# Patient Record
Sex: MALE | Race: WHITE | NOT HISPANIC OR LATINO | Employment: STUDENT | URBAN - METROPOLITAN AREA
[De-identification: names, ages, dates, MRNs, and addresses within clinical notes are randomized per-mention and may not be internally consistent; named-entity substitution may affect disease eponyms.]

---

## 2017-08-14 ENCOUNTER — THERAPY VISIT (OUTPATIENT)
Dept: PHYSICAL THERAPY | Facility: CLINIC | Age: 22
End: 2017-08-14
Payer: COMMERCIAL

## 2017-08-14 DIAGNOSIS — M25.562 KNEE PAIN, LEFT: ICD-10-CM

## 2017-08-14 DIAGNOSIS — M25.561 KNEE PAIN, RIGHT: ICD-10-CM

## 2017-08-14 PROCEDURE — 97110 THERAPEUTIC EXERCISES: CPT | Mod: GP | Performed by: PHYSICAL THERAPIST

## 2017-08-14 PROCEDURE — 97530 THERAPEUTIC ACTIVITIES: CPT | Mod: GP | Performed by: PHYSICAL THERAPIST

## 2017-08-14 PROCEDURE — 97161 PT EVAL LOW COMPLEX 20 MIN: CPT | Mod: GP | Performed by: PHYSICAL THERAPIST

## 2017-08-14 ASSESSMENT — ACTIVITIES OF DAILY LIVING (ADL)
STAND: ACTIVITY IS MINIMALLY DIFFICULT
AS_A_RESULT_OF_YOUR_KNEE_INJURY,_HOW_WOULD_YOU_RATE_YOUR_CURRENT_LEVEL_OF_DAILY_ACTIVITY?: ABNORMAL
KNEEL ON THE FRONT OF YOUR KNEE: ACTIVITY IS VERY DIFFICULT
RISE FROM A CHAIR: ACTIVITY IS SOMEWHAT DIFFICULT
WALK: ACTIVITY IS MINIMALLY DIFFICULT
GO UP STAIRS: ACTIVITY IS MINIMALLY DIFFICULT
GIVING WAY, BUCKLING OR SHIFTING OF KNEE: I DO NOT HAVE THE SYMPTOM
KNEE_ACTIVITY_OF_DAILY_LIVING_SUM: 48
HOW_WOULD_YOU_RATE_THE_CURRENT_FUNCTION_OF_YOUR_KNEE_DURING_YOUR_USUAL_DAILY_ACTIVITIES_ON_A_SCALE_FROM_0_TO_100_WITH_100_BEING_YOUR_LEVEL_OF_KNEE_FUNCTION_PRIOR_TO_YOUR_INJURY_AND_0_BEING_THE_INABILITY_TO_PERFORM_ANY_OF_YOUR_USUAL_DAILY_ACTIVITIES?: 30
KNEE_ACTIVITY_OF_DAILY_LIVING_SCORE: 68.57
SQUAT: ACTIVITY IS FAIRLY DIFFICULT
SWELLING: I DO NOT HAVE THE SYMPTOM
LIMPING: THE SYMPTOM AFFECTS MY ACTIVITY SLIGHTLY
PAIN: THE SYMPTOM AFFECTS MY ACTIVITY MODERATELY
STIFFNESS: THE SYMPTOM AFFECTS MY ACTIVITY SLIGHTLY
RAW_SCORE: 48
GO DOWN STAIRS: ACTIVITY IS SOMEWHAT DIFFICULT
HOW_WOULD_YOU_RATE_THE_OVERALL_FUNCTION_OF_YOUR_KNEE_DURING_YOUR_USUAL_DAILY_ACTIVITIES?: ABNORMAL
WEAKNESS: I DO NOT HAVE THE SYMPTOM
SIT WITH YOUR KNEE BENT: ACTIVITY IS MINIMALLY DIFFICULT

## 2017-08-14 NOTE — MR AVS SNAPSHOT
"              After Visit Summary   8/14/2017    Alex Peng    MRN: [id number]           Patient Information     Date Of Birth          1995        Visit Information        Provider Department      8/14/2017 1:10 PM Himanshu Mcadams PT [facility] Physical Therapy        Today's Diagnoses     Knee pain, right        Knee pain, left           Follow-ups after your visit        Your next 10 appointments already scheduled     Aug 21, 2017  8:40 AM CDT   SCOTT Extremity with Himanshu Mcadams PT   [facility] Physical Therapy ([facility]  )    [address]  [facility] [address] [phone]              Who to contact     If you have questions or need follow up information about today's clinic visit or your schedule please contact [facility] PHYSICAL THERAPY directly at [phone].  Normal or non-critical lab and imaging results will be communicated to you by Origo.byhart, letter or phone within 4 business days after the clinic has received the results. If you do not hear from us within 7 days, please contact the clinic through [email or web address].byhart or phone. If you have a critical or abnormal lab result, we will notify you by phone as soon as possible.  Submit refill requests through Tradier or call your pharmacy and they will forward the refill request to us. Please allow 3 business days for your refill to be completed.          Additional Information About Your Visit        [email or web address] Information     Tradier lets you send messages to your doctor, view your test results, renew your prescriptions, schedule appointments and more. To sign up, go to [email or web address] . Click on \"Log in\" on the left side of the screen, which will take you to the Welcome page. Then click on \"Sign up Now\" on the right side of the page.     You will be asked to enter the access code listed below, as well as some personal information. Please follow " the directions to create your username and password.     Your access code is: [email or web address]  Expires: 2017  4:38 PM     Your access code will  in 90 days. If you need help or a new code, please call your [facility] clinic or [phone].        Care EveryWhere ID     This is your Care EveryWhere ID. This could be used by other organizations to access your [facility] medical records  [id number]         Blood Pressure from Last 3 Encounters:   12/30/15 118/80   12 114/59   11 100/65    Weight from Last 3 Encounters:   12/30/15 54.4 kg (120 lb)   11 57.6 kg (127 lb) (22 %)*   08 45.7 kg (100 lb 11.2 oz) (24 %)*     * Growth percentiles are based on [facility] 2-20 Years data.              We Performed the Following     HC PT EVAL, LOW COMPLEXITY     SCOTT INITIAL EVAL REPORT     THERAPEUTIC ACTIVITIES     THERAPEUTIC EXERCISES        Primary Care Provider Office Phone # Fax #    Ap Jones [phone] [address] [address]        Equal Access to Services     [facility] : Hadii aad ku kingsleyo Sokarlene, waaxda luqadaha, qaybta kaalmada adefarayada, nilesh olivares. So [facility] [phone].    ATENCIÓN: Si habla español, tiene a bedolla disposición servicios gratuitos de asistencia lingüística. Llame al [phone].    We comply with applicable federal civil rights laws and [place] laws. We do not discriminate on the basis of race, color, national origin, age, disability sex, sexual orientation or gender identity.            Thank you!     Thank you for choosing [facility] PHYSICAL THERAPY  for your care. Our goal is always to provide you with excellent care. Hearing back from our patients is one way we can continue to improve our services. Please take a few minutes to complete the written survey that you may receive in the mail after your visit with us. Thank you!             Your Updated  Medication List - Protect others around you: Learn how to safely use, store and throw away your medicines at www.disposemymeds.org.          This list is accurate as of: 8/14/17  4:38 PM.  Always use your most recent med list.                   Brand Name Dispense Instructions for use Diagnosis    ALPRAZolam 0.5 MG tablet    XANAX     Take 0.5 mg by mouth 3 times daily as needed for anxiety (pt states takes 1 tab as needed. Has taken 3 times daily)        Methylphenidate HCl ER 54 MG Tb24      Take 1 tablet by mouth daily        mirtazapine 30 MG tablet    REMERON     Take 30 mg by mouth At Bedtime

## 2017-08-14 NOTE — LETTER
[place] [facility] PHYSICAL [facility]  [address]  [facility] [id number] [phone]    2017    Re: Alex Peng   :   1995  MRN:  [id number]    REFERRING PHYSICIAN:   Juarez Ramos    [facility] ATHLETIC [facility] PHYSICAL [facility]    Date of Initial Evaluation:  2017  Visits:  Rxs Used: 1  Reason for Referral:     Knee pain, right  Knee pain, left    EVALUATION SUMMARY    Subjective:  Patient is a 22 year old male presenting with rehab left knee hpi.   Alex Peng is a 22 year old male with a bilateral knees condition.  Condition occurred with:  Insidious onset.  Condition occurred: for unknown reasons.  This is a recurrent condition  17.  Pt was working which required a lot of standing, thinks he had his knees hyper extended most of the day.  It was a lot more walking than usual, mostly on concrete floor.  Has been seen by his PCP and ortho.  No imaging done.  PCP provided knee braces which the pt has been wearing inconsistently and doesn't know their effect on his symptoms.  Pt reports having the same knee problems since  from snowboarding injury.  Has off/on knee problems since then.  Current episode feels similar to previous episodes.  Pain has previously decreased by decreasing activity, has tried PT but can't recall its efficacy.  Pain increased yesterday - thinks it might be from a seated foot massage on a vibrating platform, a lot of walking, other activity the day before..    Patient reports pain:  Sub patellar.  Radiates to: lateral thigh.  Pain is described as burning and aching (occasionally sharp) and is intermittent and reported as 3/10.  Associated symptoms:  Other (cracking). Pain is the same all the time (with weight bearing).  Symptoms are exacerbated by weight bearing, kneeling, descending stairs and walking and relieved by rest.  Since onset symptoms are gradually improving (though increased  yesterday).    Previous treatment includes physical therapy.  There was mild improvement following previous treatment.  General health as reported by patient is poor.  Pertinent medical history includes:  Seizures, depression and sleep disorder/apnea (seizures are controlled).  Medical allergies: no.  Other surgeries include:  None reported.  Current medications:  Anti-seizure medication, sleep medication, anti-depressants and other (stimulants).  Current occupation is none.    Primary job tasks include:  Prolonged sitting, prolonged standing, lifting and other (pushing/pulling, computer work).  Barriers include:  None as reported by the patient.  Red flags:  None as reported by the patient.  Objective:  Standing Alignment:    Lumbar:  Lordosis decr  Pelvic:  Normal  Hip deviations alignment: R>L increased femoral medial rotation.  Knee deviations alignment: pt reports he usually stands in B genu recurvatum, but he has been avoiding it lately due to pain.  Physical Exam   Functional Tests:  L single leg stance: Trendelenberg, reports instability  R single leg stance: femoral medial rotation, reports instability, slight increase in pain at patella  Partial squat: good femoral control, no change in sxs  Sensation:  Light touch intact and symmetrical B LEs  Strength:  Hip flexion: B 5/5 slight increase in pain B  Knee extension: B 5/5 pain free  Dorsiflexion: B 5/5 pain free  Glut med: B 4/5, pain R medial knee while resisting L  Glut max: B 4+/5, pain R in lumbar spine  Hamstrings: B 5/5, pain B anterior inferior knee    ROM:  Motion L R Comments   Active knee flexion 140 143 Pain free   Passive knee ext 5 deg hyper 8 deg hyper Pt states he can go further but it hurts, R>L pain     Palpation:   B tenderness along ITB and patellar tendon, non tender at joint lines and with patellar mobizations  Edema:  None noted  Gait:  R>L femoral medial rotation, excessive lumbopelvic rotation, lacks full knee ext  B  Assessment/Plan:    Patient is a 22 year old male with both sides knee complaints.    Patient has the following significant findings with corresponding treatment plan.                Diagnosis 1:  B knee pain  Pain -  hot/cold therapy, manual therapy, education and home program  Decreased strength - therapeutic exercise, therapeutic activities and home program  Impaired gait - gait training and home program  Impaired muscle performance - neuro re-education and home program  Decreased function - therapeutic activities and home program  Impaired posture - neuro re-education, therapeutic activities and home program    Therapy Evaluation Codes:   1) History comprised of:   Personal factors that impact the plan of care:      Past/current experiences and Time since onset of symptoms.    Comorbidity factors that impact the plan of care are:      Depression, Seizures and Sleep disorder/apnea.     Medications impacting care: Anti-depressant, Sleep and anti-seizure medication, stimulants.  2) Examination of Body Systems comprised of:   Body structures and functions that impact the plan of care:      Knee.   Activity limitations that impact the plan of care are:      Squatting/kneeling, Stairs and Walking.  3) Clinical presentation characteristics are:   Stable/Uncomplicated.  4) Decision-Making    Low complexity using standardized patient assessment instrument and/or measureable assessment of functional outcome.  Cumulative Therapy Evaluation is: Low complexity.    Previous and current functional limitations:  (See Goal Flow Sheet for this information)    Short term and Long term goals: (See Goal Flow Sheet for this information)     Communication ability:  Patient appears to be able to clearly communicate and understand verbal and written communication and follow directions correctly.  Treatment Explanation - The following has been discussed with the patient:   RX ordered/plan of care  Anticipated outcomes  Possible risks  and side effects  This patient would benefit from PT intervention to resume normal activities.   Rehab potential is good.    Frequency:  1 X week, once daily  Duration:  for 6 weeks  Discharge Plan:  Achieve all LTG.  Independent in home treatment program.  Reach maximal therapeutic benefit.        Thank you for your referral.    INQUIRIES  Therapist: Himanshu Mcadams PT, DPT   [place] [facility] PHYSICAL THERAPY  [address] [place] [address] [address]  [facility] [address]  Phone: [phone]  Fax: [phone]

## 2017-08-21 ENCOUNTER — THERAPY VISIT (OUTPATIENT)
Dept: PHYSICAL THERAPY | Facility: CLINIC | Age: 22
End: 2017-08-21
Payer: COMMERCIAL

## 2017-08-21 DIAGNOSIS — M25.561 KNEE PAIN, RIGHT: ICD-10-CM

## 2017-08-21 DIAGNOSIS — M25.562 KNEE PAIN, LEFT: ICD-10-CM

## 2017-08-21 PROCEDURE — 97110 THERAPEUTIC EXERCISES: CPT | Mod: GP | Performed by: PHYSICAL THERAPIST

## 2017-08-28 ENCOUNTER — THERAPY VISIT (OUTPATIENT)
Dept: PHYSICAL THERAPY | Facility: CLINIC | Age: 22
End: 2017-08-28
Payer: COMMERCIAL

## 2017-08-28 DIAGNOSIS — M25.561 KNEE PAIN, RIGHT: ICD-10-CM

## 2017-08-28 DIAGNOSIS — M25.562 KNEE PAIN, LEFT: ICD-10-CM

## 2017-08-28 PROCEDURE — 97112 NEUROMUSCULAR REEDUCATION: CPT | Mod: GP | Performed by: PHYSICAL THERAPIST

## 2017-08-28 PROCEDURE — 97110 THERAPEUTIC EXERCISES: CPT | Mod: GP | Performed by: PHYSICAL THERAPIST

## 2017-08-28 PROCEDURE — 97530 THERAPEUTIC ACTIVITIES: CPT | Mod: GP | Performed by: PHYSICAL THERAPIST

## 2017-09-11 ENCOUNTER — THERAPY VISIT (OUTPATIENT)
Dept: PHYSICAL THERAPY | Facility: CLINIC | Age: 22
End: 2017-09-11
Payer: COMMERCIAL

## 2017-09-11 DIAGNOSIS — M25.561 ACUTE PAIN OF RIGHT KNEE: ICD-10-CM

## 2017-09-11 DIAGNOSIS — M25.562 ACUTE PAIN OF LEFT KNEE: ICD-10-CM

## 2017-09-11 PROCEDURE — 97110 THERAPEUTIC EXERCISES: CPT | Mod: GP | Performed by: PHYSICAL THERAPIST

## 2017-09-11 PROCEDURE — 97530 THERAPEUTIC ACTIVITIES: CPT | Mod: GP | Performed by: PHYSICAL THERAPIST

## 2017-09-11 PROCEDURE — 97112 NEUROMUSCULAR REEDUCATION: CPT | Mod: GP | Performed by: PHYSICAL THERAPIST

## 2017-09-11 NOTE — LETTER
[facility] ATHLETIC [facility] PHYSICAL [facility]  [address]  [facility] [address] [phone]    2017    Re: Alex Peng   :   1995  MRN:  [id number]   REFERRING PHYSICIAN:   Juarez Ramos    [facility] ATHLETIC [facility]  Date of Initial Evaluation:  2017  Visits: 4 Rxs Used: 4  Reason for Referral:     Acute pain of right knee  Acute pain of left knee    PROGRESS  REPORT  Progress reporting period is from 2017 to 2017.       SUBJECTIVE  Subjective changes noted by patient:  Subjective: Has been biking a small amount and walking.      Current pain level is  Current Pain level: 0/10.     Previous pain level was   Initial Pain level: 3/10.   Changes in function:  Yes (See Goal flowsheet attached for changes in current functional level)  Adverse reaction to treatment or activity: None    OBJECTIVE  Changes noted in objective findings:  Yes,   Objective: No pain with squatting and lifting #10 in crate today.  Feels some pressure with full squat especially with coming out of this.  Good knee control with step down from 2 inch step     ASSESSMENT/PLAN  Updated problem list and treatment plan: Diagnosis 1:  Knee pain  Pain -  hot/cold therapy, self management, education and home program  Decreased ROM/flexibility - manual therapy, therapeutic exercise and home program  Decreased strength - therapeutic exercise, therapeutic activities and home program  Decreased function - therapeutic activities and home program  STG/LTGs have been met or progress has been made towards goals:  Yes (See Goal flow sheet completed today.)  Assessment of Progress: The patient's condition is improving.  The patient's condition has potential to improve.  Self Management Plans:  Patient has been instructed in a home treatment program.  I have re-evaluated this patient and find that the nature, scope, duration and intensity of the therapy is  appropriate for the medical condition of the patient.  Alex continues to require the following intervention to meet STG and LTG's:  Patient needs to continue to work on the home exercise program.    Recommendations:  This patient is ready to be discharged from therapy and continue their home treatment program.              Re: Alex Winterdeangelonatalee   :   1995    Thank you for your referral.    INQUIRIES  Therapist: Rach Sexton PT [facility]  [facility] PHYSICAL THERAPY  [address] [place] [address] [address]  [facility] [address]  Phone: [phone]  Fax: [phone]

## 2017-09-11 NOTE — PROGRESS NOTES
Subjective:    HPI                    Objective:    System    Physical Exam    General     ROS    Assessment/Plan:      PROGRESS  REPORT    Progress reporting period is from 8/14/2017 to 9/11/2017.       SUBJECTIVE  Subjective changes noted by patient:  .  Subjective: Has been biking a small amount and walking.      Current pain level is  Current Pain level: 0/10.     Previous pain level was   Initial Pain level: 3/10.   Changes in function:  Yes (See Goal flowsheet attached for changes in current functional level)  Adverse reaction to treatment or activity: None    OBJECTIVE  Changes noted in objective findings:  Yes,   Objective: No pain with squatting and lifting #10 in crate today.  Feels some pressure with full squat especially with coming out of this.  Good knee control with step down from 2 inch step     ASSESSMENT/PLAN  Updated problem list and treatment plan: Diagnosis 1:  Knee pain  Pain -  hot/cold therapy, self management, education and home program  Decreased ROM/flexibility - manual therapy, therapeutic exercise and home program  Decreased strength - therapeutic exercise, therapeutic activities and home program  Decreased function - therapeutic activities and home program  STG/LTGs have been met or progress has been made towards goals:  Yes (See Goal flow sheet completed today.)  Assessment of Progress: The patient's condition is improving.  The patient's condition has potential to improve.  Self Management Plans:  Patient has been instructed in a home treatment program.  I have re-evaluated this patient and find that the nature, scope, duration and intensity of the therapy is appropriate for the medical condition of the patient.  Alex continues to require the following intervention to meet STG and LTG's:  Patient needs to continue to work on the home exercise program.      Recommendations:  This patient is ready to be discharged from therapy and continue their home treatment program.    Please refer  to the daily flowsheet for treatment today, total treatment time and time spent performing 1:1 timed codes.

## 2017-09-11 NOTE — MR AVS SNAPSHOT
"              After Visit Summary   2017    Alex Peng    MRN: [id number]           Patient Information     Date Of Birth          1995        Visit Information        Provider Department      2017 10:50 AM Rach Sexton PT [facility] Physical Therapy        Today's Diagnoses     Acute pain of right knee        Acute pain of left knee           Follow-ups after your visit        Who to contact     If you have questions or need follow up information about today's clinic visit or your schedule please contact [facility] ATHLETIC [facility] PHYSICAL [facility] directly at [phone].  Normal or non-critical lab and imaging results will be communicated to you by Total Nutraceutical Solutionshart, letter or phone within 4 business days after the clinic has received the results. If you do not hear from us within 7 days, please contact the clinic through Total Nutraceutical Solutionshart or phone. If you have a critical or abnormal lab result, we will notify you by phone as soon as possible.  Submit refill requests through [employer] or call your pharmacy and they will forward the refill request to us. Please allow 3 business days for your refill to be completed.          Additional Information About Your Visit        MyChart Information     [employer] lets you send messages to your doctor, view your test results, renew your prescriptions, schedule appointments and more. To sign up, go to [email or web address] . Click on \"Log in\" on the left side of the screen, which will take you to the Welcome page. Then click on \"Sign up Now\" on the right side of the page.     You will be asked to enter the access code listed below, as well as some personal information. Please follow the directions to create your username and password.     Your access code is: [email or web address]  Expires: 2017  4:38 PM     Your access code will  in 90 days. If you need help or a new code, please call your [facility] clinic or [phone].   "      Care EveryWhere ID     This is your Care EveryWhere ID. This could be used by other organizations to access your [facility] medical records  [id number]         Blood Pressure from Last 3 Encounters:   12/30/15 118/80   02/27/12 114/59   05/14/11 100/65    Weight from Last 3 Encounters:   12/30/15 54.4 kg (120 lb)   05/14/11 57.6 kg (127 lb) (22 %)*   05/24/08 45.7 kg (100 lb 11.2 oz) (24 %)*     * Growth percentiles are based on [facility] 2-20 Years data.              We Performed the Following     SCOTT PROGRESS NOTES REPORT     NEUROMUSCULAR RE-EDUCATION     THERAPEUTIC ACTIVITIES     THERAPEUTIC EXERCISES        Primary Care Provider Office Phone # Fax #    Ap Jones [phone] [address] [address]        Equal Access to Services     [facility]: Hadii gareth schroeder hadasho Sokralene, waaxda luqadaha, qaybta kaalmada adefarayada, nilesh shannon . So [facility] [phone].    ATENCIÓN: Si habla español, tiene a bedolla disposición servicios gratuitos de asistencia lingüística. Llame al [phone].    We comply with applicable federal civil rights laws and [place] laws. We do not discriminate on the basis of race, color, national origin, age, disability sex, sexual orientation or gender identity.            Thank you!     Thank you for choosing [facility] PHYSICAL THERAPY  for your care. Our goal is always to provide you with excellent care. Hearing back from our patients is one way we can continue to improve our services. Please take a few minutes to complete the written survey that you may receive in the mail after your visit with us. Thank you!             Your Updated Medication List - Protect others around you: Learn how to safely use, store and throw away your medicines at www.disposemymeds.org.          This list is accurate as of: 9/11/17 11:55 AM.  Always use your most recent med list.                   Brand  Name Dispense Instructions for use Diagnosis    ALPRAZolam 0.5 MG tablet    XANAX     Take 0.5 mg by mouth 3 times daily as needed for anxiety (pt states takes 1 tab as needed. Has taken 3 times daily)        Methylphenidate HCl ER 54 MG Tb24      Take 1 tablet by mouth daily        mirtazapine 30 MG tablet    REMERON     Take 30 mg by mouth At Bedtime

## 2017-10-19 NOTE — TELEPHONE ENCOUNTER
"APPT INFORMATION    Date /Time: 11/7/17, 8:30am    Reason for Appt: Seizures    Ref Provider/Clinic: Self    Patient Contact (Y/N) & Call Details: Yes, transferred from [facility] (pt will sign JULES [facility] at [facility])   Action:      RECORDS CLINIC NAME  (\"None\" if no records ) RECEIVED RECS & IMG? Y/N   (may include other helpful notes)   Internal Clinics: none         External Clinics: [facility]  Need to request records             "

## 2017-10-23 ENCOUNTER — OFFICE VISIT (OUTPATIENT)
Dept: NEUROLOGY | Facility: CLINIC | Age: 22
End: 2017-10-23

## 2017-10-23 DIAGNOSIS — R56.9 SEIZURES (H): Primary | ICD-10-CM

## 2017-10-23 NOTE — MR AVS SNAPSHOT
After Visit Summary   10/23/2017    Alex Peng    MRN: [id number]           Patient Information     Date Of Birth          1995        Visit Information        Provider Department      10/23/2017 8:30 AM  EEG TECH 2 [facility] OUTPATIENT        Today's Diagnoses     Seizures (H)    -  1       Follow-ups after your visit        Your next 10 appointments already scheduled     2017  8:30 AM CST   (Arrive by 8:15 AM)   New Seizure with Esau Rodríguez MD   [facility] Neurology ([facility])    [address] [place] [id number] [phone]              Who to contact     Please call your clinic at [phone] to:    Ask questions about your health    Make or cancel appointments    Discuss your medicines    Learn about your test results    Speak to your doctor   If you have compliments or concerns about an experience at your clinic, or if you wish to file a complaint, please contact [facility] Patient Relations at [phone] or email us at [email or web address]         Additional Information About Your Visit        MyChart Information     [employer] is an electronic gateway that provides easy, online access to your medical records. With Onehub, you can request a clinic appointment, read your test results, renew a prescription or communicate with your care team.     To sign up for Onehub visit the website at [email or web address]   You will be asked to enter the access code listed below, as well as some personal information. Please follow the directions to create your username and password.     Your access code is: [email or web address]  Expires: 2017  4:38 PM     Your access code will  in 90 days. If you need help or a new code, please contact your [facility] Clinic or call [phone] for assistance.        Care EveryWhere ID     This is your Care EveryWhere ID.  This could be used by other organizations to access your [facility] medical records  [id number]         Blood Pressure from Last 3 Encounters:   No data found for BP    Weight from Last 3 Encounters:   No data found for Wt              Today, you had the following     No orders found for display       Primary Care Provider Office Phone # Fax #    Ap Jones [phone] [address] [facility] [address] [address]        Equal Access to Services     PADMINI BEAN : Hadii aad ku hadasho Soomaali, waaxda luqadaha, qaybta kaalmada adeegyada, waxay idiin hayaan adeeg kharash lascout . So [facility] [phone].    ATENCIÓN: Si habla espashutosh, tiene a bedolla disposición servicios gratuitos de asistencia lingüística. Llame al [phone].    We comply with applicable federal civil rights laws and [place] laws. We do not discriminate on the basis of race, color, national origin, age, disability, sex, sexual orientation, or gender identity.            Thank you!     Thank you for choosing [facility]  for your care. Our goal is always to provide you with excellent care. Hearing back from our patients is one way we can continue to improve our services. Please take a few minutes to complete the written survey that you may receive in the mail after your visit with us. Thank you!             Your Updated Medication List - Protect others around you: Learn how to safely use, store and throw away your medicines at www.disposemymeds.org.          This list is accurate as of: 10/23/17 11:59 PM.  Always use your most recent med list.                   Brand Name Dispense Instructions for use Diagnosis    ALPRAZolam 0.5 MG tablet    XANAX     Take 0.5 mg by mouth 3 times daily as needed for anxiety (pt states takes 1 tab as needed. Has taken 3 times daily)        Methylphenidate HCl ER 54 MG Tb24      Take 1 tablet by mouth daily        mirtazapine 30 MG tablet    REMERON     Take 30 mg by mouth At  Bedtime

## 2017-10-23 NOTE — Clinical Note
EEG is ready for interpretation   Pt is scheduled to see odilia sandy as new pt in November   Thanks  Alana

## 2017-10-24 NOTE — TELEPHONE ENCOUNTER
ACTION    What did you do?  [place] [facility].

## 2017-10-27 NOTE — PROCEDURES
[facility] EEG #  (Out-Patient Video-EEG Monitoring)    Name:     Alex Peng   MRN: [id number]   : 1995   Procedure Date: 10/23/2017   Duration of Recording:  3 hours, 0 minutes.      CLINICAL SUMMARY:  This diagnostic video-EEG monitoring procedure was performed in evaluation of seizures in Alex Peng.  He was reported to be receiving levetiracetam, mirtazapine, alprazolam and methylphenidate at the time of this recording.      TECHNICAL SUMMARY:  This continuous EEG monitoring procedure was performed with 23 scalp electrodes in 10-20 system placements, and additional scalp, precordial and other surface electrodes used for electrical referencing and artifact detection.  A single channel of EKG was recorded for purposes of analyzing EKG artifacts in the EEG channels.  Video monitoring was utilized and periodically reviewed by EEG technologist and the physician for electroclinical correlation.    INTERICTAL EEG ACTIVITIES:  During maximal waking, there was a symmetric, well-modulated, approximately 10 Hz posterior dominant rhythm, which was attenuated on eye opening.  Lower amplitude faster activities predominated anteriorly.  Drowsiness was manifested by predominance of centrally maximum semirhythmic theta slowing and dropout of the posterior dominant rhythm during deeper drowsiness.  There was symmetric bilateral driving in response to photic stimulation.  Hyperventilation did not induce any definite response.   No interictal epileptiform abnormalities were recorded.    ICTAL RECORDINGS:  No electrographic seizures and no paroxysmal behavioral events occurred during this procedure.      SUMMARY OF VIDEO-EEG MONITORING:    The interictal recording was normal in waking and drowsiness.  No pathological slowing, no interictal epileptiform abnormalities, no electrographic seizures and no paroxysmal behavioral events were recorded during the period of monitoring.   Clinical correlation is  recommended.   Esau Rodríguez M.D., Professor of Neurology      D: 10/26/2017 17:24   T: 10/26/2017 17:39   MT: BJ      Name:     GRACE DONNELLY   MRN:      [id number]        Account:        [id number]   :      1995           Procedure Date: 10/23/2017      Document: [id number]

## 2017-11-04 ASSESSMENT — ENCOUNTER SYMPTOMS
SINUS CONGESTION: 0
BLOATING: 0
EYE WATERING: 1
TREMORS: 1
HEADACHES: 1
NECK MASS: 0
NAUSEA: 0
EYE IRRITATION: 1
NERVOUS/ANXIOUS: 1
SKIN CHANGES: 0
STIFFNESS: 1
PANIC: 0
SINUS PAIN: 0
DIARRHEA: 1
CONSTIPATION: 1
NUMBNESS: 1
EYE REDNESS: 0
JAUNDICE: 0
BACK PAIN: 1
NECK PAIN: 0
HEARTBURN: 0
JOINT SWELLING: 0
LOSS OF CONSCIOUSNESS: 0
WEAKNESS: 0
MEMORY LOSS: 0
INSOMNIA: 1
MYALGIAS: 0
TROUBLE SWALLOWING: 0
PARALYSIS: 0
DEPRESSION: 1
DISTURBANCES IN COORDINATION: 0
RECTAL PAIN: 0
ABDOMINAL PAIN: 1
HOARSE VOICE: 0
ARTHRALGIAS: 1
MUSCLE WEAKNESS: 0
DOUBLE VISION: 0
POOR WOUND HEALING: 0
EYE PAIN: 0
BLOOD IN STOOL: 0
DIZZINESS: 1
TASTE DISTURBANCE: 0
SORE THROAT: 1
SMELL DISTURBANCE: 0
DECREASED CONCENTRATION: 1
TINGLING: 1
SPEECH CHANGE: 0
BOWEL INCONTINENCE: 0
VOMITING: 0
NAIL CHANGES: 0
MUSCLE CRAMPS: 0

## 2017-11-07 ENCOUNTER — PRE VISIT (OUTPATIENT)
Dept: NEUROLOGY | Facility: CLINIC | Age: 22
End: 2017-11-07

## 2017-11-07 ENCOUNTER — OFFICE VISIT (OUTPATIENT)
Dept: NEUROLOGY | Facility: CLINIC | Age: 22
End: 2017-11-07

## 2017-11-07 VITALS
DIASTOLIC BLOOD PRESSURE: 77 MMHG | HEIGHT: 68 IN | WEIGHT: 123.8 LBS | HEART RATE: 91 BPM | BODY MASS INDEX: 18.76 KG/M2 | OXYGEN SATURATION: 97 % | SYSTOLIC BLOOD PRESSURE: 114 MMHG

## 2017-11-07 DIAGNOSIS — G40.309 EPILEPSY, GENERALIZED, CONVULSIVE (H): Primary | ICD-10-CM

## 2017-11-07 RX ORDER — LEVETIRACETAM 250 MG/1
250 TABLET ORAL
COMMUNITY
End: 2018-02-19

## 2017-11-07 RX ORDER — TEMAZEPAM 30 MG
30 CAPSULE ORAL DAILY
COMMUNITY

## 2017-11-07 RX ORDER — METHYLPHENIDATE HYDROCHLORIDE 36 MG/1
36 TABLET ORAL DAILY
COMMUNITY

## 2017-11-07 RX ORDER — LANOLIN ALCOHOL/MO/W.PET/CERES
3 CREAM (GRAM) TOPICAL AT BEDTIME
COMMUNITY

## 2017-11-07 RX ORDER — MIRTAZAPINE 30 MG/1
30 TABLET, FILM COATED ORAL DAILY
COMMUNITY

## 2017-11-07 ASSESSMENT — PAIN SCALES - GENERAL: PAINLEVEL: NO PAIN (1)

## 2017-11-07 NOTE — MR AVS SNAPSHOT
After Visit Summary   11/7/2017    Alex Peng    MRN: [id number]           Patient Information     Date Of Birth          1995        Visit Information        Provider Department      11/7/2017 8:30 AM Esau Rodríguez MD [facility] Neurology        Today's Diagnoses     Epilepsy, generalized, convulsive (H)    -  1       Follow-ups after your visit        Follow-up notes from your care team     Return in about 3 months (around 2/7/2018).      Who to contact     Please call your clinic at [phone] to:    Ask questions about your health    Make or cancel appointments    Discuss your medicines    Learn about your test results    Speak to your doctor   If you have compliments or concerns about an experience at your clinic, or if you wish to file a complaint, please contact [facility] Patient Relations at [phone] or email us at [email or web address]         Additional Information About Your Visit        MyChart Information     [employer] gives you secure access to your electronic health record. If you see a primary care provider, you can also send messages to your care team and make appointments. If you have questions, please call your primary care clinic.  If you do not have a primary care provider, please call [phone] and they will assist you.      "[employer] is an electronic gateway that provides easy, online access to your medical records. With "[employer] you can request a clinic appointment, read your test results, renew a prescription or communicate with your care team.     To access your existing account, please contact your [facility] or call [phone] for assistance.        Care EveryWhere ID     This is your Care EveryWhere ID. This could be used by other organizations to access your [facility] medical records  [id number]        Your Vitals Were     Pulse Height Pulse Oximetry BMI (Body Mass Index)           "91 1.715 m (5' 7.5\") 97% 19.1 kg/m2         Blood Pressure from Last 3 Encounters:   11/07/17 114/77   12/30/15 118/80   02/27/12 114/59    Weight from Last 3 Encounters:   11/07/17 56.2 kg (123 lb 12.8 oz)   12/30/15 54.4 kg (120 lb)   05/14/11 57.6 kg (127 lb) (22 %)*     * Growth percentiles are based on [facility] 2-20 Years data.              Today, you had the following     No orders found for display       Primary Care Provider Office Phone # Fax #    Ap Jones [phone] [address] [address]        Equal Access to Services     PADMINI BEAN : Gilmar hoyos Sokarlene, waaxda luqadaha, qaybta kaalmada adeegyada, nilesh shannon . So [facility] [phone].    ATENCIÓN: Si habla español, tiene a bedolla disposición servicios gratuitos de asistencia lingüística. [facility] [phone].    We comply with applicable federal civil rights laws and [place] laws. We do not discriminate on the basis of race, color, national origin, age, disability, sex, sexual orientation, or gender identity.            Thank you!     Thank you for choosing [facility] NEUROLOGY  for your care. Our goal is always to provide you with excellent care. Hearing back from our patients is one way we can continue to improve our services. Please take a few minutes to complete the written survey that you may receive in the mail after your visit with us. Thank you!             Your Updated Medication List - Protect others around you: Learn how to safely use, store and throw away your medicines at www.disposemymeds.org.          This list is accurate as of: 11/7/17 11:59 PM.  Always use your most recent med list.                   Brand Name Dispense Instructions for use Diagnosis    EFFEXOR XR PO      Take 225 mg by mouth daily        KEPPRA 250 MG tablet   Generic drug:  levETIRAcetam      Take 250 mg by mouth 2 times daily        melatonin 3 MG tablet      Take 3 mg by mouth " At Bedtime        * Methylphenidate HCl ER 54 MG Tb24      Take 1 tablet by mouth daily        * CONCERTA 36 MG CR tablet   Generic drug:  methylphenidate ER      Take 36 mg by mouth daily        REMERON 30 MG tablet   Generic drug:  mirtazapine      Take 30 mg by mouth daily        RESTORIL 30 MG capsule   Generic drug:  temazepam      Take 30 mg by mouth daily        * Notice:  This list has 2 medication(s) that are the same as other medications prescribed for you. Read the directions carefully, and ask your doctor or other care provider to review them with you.
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
## 2017-11-07 NOTE — LETTER
11/7/2017       RE: Alex Peng  [address]  [facility] [address]     Dear Colleague,    Thank you for referring your patient, Alex Peng, to the [facility] NEUROLOGY at [facility]. Please see a copy of my visit note below.      [facility]:  NEW PATIENT EVALUATION    HISTORY:  Mr. Alex Peng is a 22-year-old, left-handed man who came for a consultation regarding seizures and epilepsy management.  He was able to provide detailed medical history and was accompanied to the visit today by his mother, who was able to supplement early developmental history.  I reviewed detailed medical history on the [facility] chart.      Ictal semiology-history:   The patient reportedly had 1 witnessed generalized convulsion in his lifetime, with 2 possible unwitnessed convulsions arising in sleep, followed by apparent postictal states.  The first event was a witnessed convulsion on 04/09/2017.  He was riding in a car which was being driven by a friend.  He suddenly lost consciousness without any preceding aura or other particular symptoms.  He regained consciousness to find that he was confused and disoriented for many minutes.      The patient's friend reportedly witnessed a generalized convulsion during the time the patient was unconscious.  [place] [facility]  He was found to be on Wellbutrin and this was discontinued.  A head CT scan reportedly was normal.  He was not started on an anti-seizure medication at that time.      Subsequently, the patient had 2 unwitnessed nocturnal events, both beginning when he was asleep.  The first occurred on 05/14/2017.  He had a sudden arousal from sleep in the early morning hours, and he noted that he had diffuse muscle soreness, mildly slowed thinking, evidence of hypersalivation on his face and pillow, and probable urinary incontinence (versus excessive perspiration  only on his underwear).  He reported this to the staff at [facility], and was started on levetiracetam.  Later he requested a taper off levetiracetam and after the dose had been cut to 250 mg once per day, then on 2017 he had a similar episode.  Again, he was asleep and had a sudden arousal with drooling, diffuse muscle soreness, slowed and impaired thinking and on this occasion also with a headache.  He was instructed to increase levetiracetam again to 250 mg b.i.d.      The patient denied that he has ever had a paroxysmal episode of unresponsive staring without falling, non-convulsive falling with unconsciousness, repetitive involuntary movements or posturing, sudden brief confusion, or other paroxysmal phenomena.  He denied any history of sudden involuntary jerks while fully awake, but he has had hypnic jerks.      The patient does not recognize exacerbating or remitting factors with regard to seizure frequency.      Epilepsy-seizure predispositions:   The only family history of seizures or epilepsy occurred in a maternal great-great-uncle who had traumatic brain injury followed by grand mal seizures.      He has no history of gestational or  injury, febrile convulsions, developmental delay, stroke, meningitis, encephalitis, significant head injury, or other epileptic predispositions.  He denied a history of physical or sexual abuse by an adult during his childhood or adulthood.      Laboratory evaluations:   The patient reportedly had a normal routine electroencephalogram and a normal head CT at [facility].    An outpatient 3-hour video EEG recording was normal in waking and drowsiness at [facility] on 10/23/2017.      Epilepsy therapeutics:   The patient was started on levetiracetam in 2017.  He noted irritability on 250 mg b.i.d. and levetiracetam was being tapered when a second nocturnal event occurred.  Subsequently, the dose was increased to 250 mg b.i.d., which continues to be associated with irritability,  but no other apparent adverse effects.  He has continued to take levetiracetam as prescribed.  He has not been on other antiepileptic drugs.      Other history:   The patient has had a history of psychiatric dysfunction from the preteen years or possibly earlier.  Apparently he had inattentiveness in school from an early age, but ADD was not diagnosed and treated until his late teens.  He had 4 episodes of catatonia according to records on the [facility] chart, with 3 occurring between 11 and 12 years of age and the last at 16 years of age.  The patient himself has no memory of these events, except a memory of being told that they had occurred after the fact.  His mother observed them in detail.  On each occasion, he expressed considerable difficulty with handling stress for a number of days, and then went gradually into a state of markedly reduced responsiveness.  In general, he had eyes open and maintained posture, but he was unresponsive to commands and he did not speak or express himself in any way.  He could be led around by the hand without falling.  He was taken to the emergency room on at least 2 occasions, where no abnormalities of vital signs or general physical examination were found.  These were diagnosed as catatonic episodes without an overall diagnosis at that time.  Later he was diagnosed with recurrent moderate major depression, anxiety disorder, ADD and anorexia without bulimia.  One psychologist told him that he has obsessive and compulsive traits, but apparently OCD was not formally diagnosed.  He feels that he is doing better in depression and anxiety with the current regimen.  His mother denied that he has any delusions and there is no report of hallucinations.  He denied suicidal ideation today.     PAST MEDICAL-SURGICAL HISTORY:    1. 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   2. Recurrent moderate major depression, anxiety disorder, anorexia, ADD and recurrent  catatonia, by history.     FAMILY HISTORY:  The only reported neurologic condition was posttraumatic epilepsy in a maternal great-great-uncle.      PERSONAL AND SOCIAL HISTORY:  The patient grew up in [place].  He graduated from high school in regular classes and attended approximately 2 years of college.  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  He lives with his parents.  He does not use tobacco, ethanol, or recreational drugs.      REVIEW OF SYSTEMS:  He denied history of attention and memory disturbance, difficulties with comprehension and expression, difficulty in solving problems, weakness, tremors or other abnormal involuntary movements, numbness or tingling, incoordination, falling or difficulty in walking, urinary or fecal incontinence, headache and other pain, except as described above.  He denied rashes and easy bruising.  The remainder of a 14-system symptom review was negative.    MEDICATIONS:  Levetiracetam 250 mg b.i.d., methylphenidate, venlafaxine, mirtazapine, and other medications as per the electronic medical record.     PHYSICAL EXAMINATION:    The patient was an alert man in no acute distress, with continuous marked flattening of affect.  Pulse was 91 and regular.  Blood pressure was 114/77.  Respirations were 18 per minute.  Height was 68 inches.  Weight was 124 pounds.  Skull was normocephalic and atraumatic.  Neck was supple, without signs of meningeal irritation.       On neurological examination the patient appeared alert and was fully oriented to person, place, time, and reason for visit.  Speech showed normal articulation, fluency, repetitions, naming, syntax and comprehension.  He accurately repeated digits sequences, repeating 8 forward and 5 reversed.  At 10 minutes from presentation, 4 of 4 phrases were recalled.  No apraxias or atavistic signs were elicited.  Fundoscopy was normal bilaterally.  Cranial nerves III through XII were normal.  Muscle  masses, tones and strengths were normal throughout.  There was no pronator drift.  Sequential fine finger movements were performed normally with each hand.  No spontaneous tremors, myoclonus, or other abnormal movements were observed.  Sensations of light touch, pin prick, vibration and proprioception were reportedly normal throughout.  The rapid alternating movements, and finger-nose-finger and heel-shin maneuvers were performed normally bilaterally.  Romberg maneuver was negative.  Regular, heel, toe, tandem and reverse tandem walking were normal.  Deep tendon reflexes were normal and symmetric throughout.  Toes were downgoing bilaterally.      IMPRESSION:    The patient probably has idiopathic or cryptogenic epilepsy, with 3 grand mal seizures, 1 witnessed and 2 unwitnessed.  I told him that it would be reasonable to perform a brain MRI scan to screen for lesions that might be missed by a CT scan.  I also recommended that he should consider switching from levetiracetam monotherapy to lamotrigine monotherapy.  We specifically discussed the possibility that he might have lamotrigine allergy, in which case an alternative medication might better be recommended.  He has had consistent irritability on levetiracetam and I told him that this would likely persist when he is on this drug.  We also discussed the possibility of obtaining a blood level of levetiracetam, if he decided to stay on this medication.     The patient expressed a great deal of discomfort with the diagnosis of seizures by indirect information, such as analysis of an apparent postictal state following a period of unwitnessed sleep with atypical arousal, has occurred with him.  He decided that he did not want to perform a brain MRI or consider switching to lamotrigine.  He stated that he might decide to discontinue levetiracetam on his own.  He did state that he would not discontinue the medications prescribed by his psychiatrist, however.  He did agree  to consider my recommendations further and to return to discuss this with me in clinic in about 3 months.  His mother stated that he may change his mind and decide to call back to the clinic to further discuss brain MRI scheduling and initiation of lamotrigine.     The patient clearly has a mixture of psychiatric symptoms.  I am rather concerned that he has had multiple episodes of catatonia in the past.  He is progressing very poorly in early adult development, educationally and psychosocially, but I did not actually see evidence of a thought disorder in terms of disorganization, hallucinations or delusions.  He stated that he is following up with a [place] [facility] but he did not authorize the use of the Care Everywhere function or transfer of medical records.  He did specifically state that he had no thoughts of death or suicide.     The patient reported that he stopped driving as was recommended at the time of the first seizure in 04/2017 and he has not subsequently resumed driving.  He appeared to clearly understand that he is prohibited from operating a motor vehicle within 3 months following any seizure or other episode with sudden unconsciousness or inability to sit up, and that he is required to report any future such seizure to the DPS within 30 days after the event.  I also recommended that he and his family review all of his other activities, and avoid any activities that might lead to self-injury or injury of others, within 3 months following any seizure with impaired awareness or impaired motor control.  Such activities include but are not limited to holding babies or young children at heights from which they might be injured if dropped, bathing infants or young children in situations in which they might drown without continuous interactive care by an adult who is fully capable at all times during the bath, operating power cutting or other tools, handling firearms, exposure to heights from which he  might fall, exposure to vessels with hot cooking oil or water, and tub-bathing or swimming alone.      PLAN:    1. The patient declined any anti-seizure medication prescription and any diagnostic testing today.   2. Return visit in approximately 3 months.   I spent 75 minutes in this patient care, more than 50% of which consisted of counseling and coordinating care.       Esau Rodríguez M.D.    of Neurology         D: 2017 11:12   T: 2017 13:56   MT: tb      Name:     GRACE DONNELLY   MRN:      [id number]        Account:      [id number]   :      1995           Service Date: 2017      Document: [id number]            Again, thank you for allowing me to participate in the care of your patient.      Sincerely,    Esau Rodríguez MD

## 2017-11-15 ENCOUNTER — TELEPHONE (OUTPATIENT)
Dept: NEUROLOGY | Facility: CLINIC | Age: 22
End: 2017-11-15

## 2017-11-15 NOTE — TELEPHONE ENCOUNTER
Nurse recieved In-Basket message as below:    Copied message and MyChart message - routed to Dr. Rodríguez via In- Basket message.

## 2017-11-15 NOTE — TELEPHONE ENCOUNTER
----- Message from Delia Clark LPN sent at 11/15/2017 12:45 PM CST -----  Regarding: see MyChart msg  Contact: [phone]  Caller name: patient     Treating provider/specialty: Marcos    Nurse:    Best time to return call:    Message left?     Description of issue/question:  wants to start lamictal   to [facility] pharmacy

## 2017-11-17 ENCOUNTER — TELEPHONE (OUTPATIENT)
Dept: NEUROLOGY | Facility: CLINIC | Age: 22
End: 2017-11-17

## 2017-11-17 DIAGNOSIS — G40.309 GENERALIZED CONVULSIVE EPILEPSY (H): Primary | ICD-10-CM

## 2017-11-17 RX ORDER — LAMOTRIGINE 25 MG/1
TABLET ORAL
Qty: 120 TABLET | Refills: 1 | Status: SHIPPED | OUTPATIENT
Start: 2017-11-17 | End: 2018-01-18

## 2017-11-17 NOTE — TELEPHONE ENCOUNTER
Marcos, MD Sharlene Becker Heather, RN        Phone Number: [phone]                       He is quite concerned with adverse effects, so I would go slowly.  He should start lamotrigine at 25 mg daily, and if no rash occurs then increase to 25 mg BID one week later, then to 25/50 mg and to 50 mg BID at weekly intervals, with a level one week after that.  He should stay on the current levetiracetam dose until we get a LTG level above 6 mcg/ml.     Did he decide whether he wants a brain MRI, which I recommended at the first visit?       Thanks      PLAN:   Alex would like to proceed with MRI.  He agrees to continue RX LEV.  He would like to initiate Lamotrigine. We discussed titration schedule in detail. Will check LTG serum level one week after achieving 50 mg BID.  We discussed SJS and Alex will call clinic/have rash evaluated immediately if a rash should develop.  Medication schedule card prepared and mailed to Alex at his home address. Also FAXED to pharmacy.  Labs to be drawn at [facility]. Lab order entered.  Routed to  as MAAME

## 2017-11-17 NOTE — PATIENT INSTRUCTIONS
Times of Days     AM    PM      Medication Tablet Size Number of Tablets/Capsules Total Daily Dosage   Lamotrigine                   Week 1: 25 mg   1       25 mg/week    Week 2: 25 mg   1   1   50 mg/week    Week 3: 25 mg   2   1   75 mg/week    Week 4: 25 mg   2   2   100 mg/week   Have labs drawn beginning of week 5                                                        Carry this with you at all times.  CONTINUE TAKING YOUR OTHER MEDICATIONS AS PREVIOUSLY DIRECTED.      * * *Do not store medications in the bathroom.  Keep medications away from children!* * *

## 2017-12-19 DIAGNOSIS — G40.309 GENERALIZED CONVULSIVE EPILEPSY (H): ICD-10-CM

## 2017-12-20 LAB — LAMOTRIGINE SERPL-MCNC: 2.7 UG/ML (ref 2.5–15)

## 2018-01-17 ENCOUNTER — TELEPHONE (OUTPATIENT)
Dept: NEUROLOGY | Facility: CLINIC | Age: 23
End: 2018-01-17

## 2018-01-17 DIAGNOSIS — G40.309 GENERALIZED CONVULSIVE EPILEPSY (H): ICD-10-CM

## 2018-01-18 RX ORDER — LAMOTRIGINE 25 MG/1
TABLET ORAL
Qty: 252 TABLET | Refills: 1 | Status: SHIPPED | OUTPATIENT
Start: 2018-01-18 | End: 2018-02-19

## 2018-02-02 ENCOUNTER — TELEPHONE (OUTPATIENT)
Dept: NEUROLOGY | Facility: CLINIC | Age: 23
End: 2018-02-02

## 2018-02-02 NOTE — TELEPHONE ENCOUNTER
"Caller: Alex   Relationship to Patient: Self   Call Back Number: [phone]   Reason for Call: Patient has concerns about his medications. He has a rash, pain passing urine, upset stomach, shortness of breath and is feeling very tired. Please give him a call back ASAP! Thanks.   Marilee Gutierres CMA    Alex has titrated Lamictal (25) to 100-100. He calls today reporting that he is experiencing shortness of breath when exercising in addition to flaking skin located on his face which occasionally itches. Denies raised red rash. Denies itching/rash elsewhere on his body. Alex stated \"my face is often red, but it's not consistent\". Alex is not certain if this is a lamictal rash as he's had a similar incident in the past that was not r/t lamictal. Alex continues to ride his back bike back/forth outside three miles each direction most days.     Recommended Alex return to [facility] clinic to evaluate the facial rash. [facility] would not return to  [facility] it would be a difficult commute. He prefers to be seen at the [facility]. A 2:00 PM appointment was available with MD at the [facility], however this time was not convenient.     PLAN:  Strongly encouraged Alex to follow up with PCP or other provider today to have rash evaluated as patient reported he was unable to return to clinic. Alex reports he will contact PCP.    Encouraged to call back with results.          "

## 2018-02-19 ENCOUNTER — OFFICE VISIT (OUTPATIENT)
Dept: NEUROLOGY | Facility: CLINIC | Age: 23
End: 2018-02-19
Payer: COMMERCIAL

## 2018-02-19 VITALS
HEIGHT: 68 IN | DIASTOLIC BLOOD PRESSURE: 70 MMHG | WEIGHT: 126.9 LBS | HEART RATE: 94 BPM | BODY MASS INDEX: 19.23 KG/M2 | SYSTOLIC BLOOD PRESSURE: 118 MMHG

## 2018-02-19 DIAGNOSIS — G40.309 GENERALIZED CONVULSIVE EPILEPSY (H): ICD-10-CM

## 2018-02-19 DIAGNOSIS — G40.309 EPILEPSY, GENERALIZED, CONVULSIVE (H): ICD-10-CM

## 2018-02-19 DIAGNOSIS — G40.309 EPILEPSY, GENERALIZED, CONVULSIVE (H): Primary | ICD-10-CM

## 2018-02-19 RX ORDER — LAMOTRIGINE 25 MG/1
TABLET ORAL
Qty: 150 TABLET | Refills: 11 | Status: SHIPPED | OUTPATIENT
Start: 2018-02-19 | End: 2019-02-26 | Stop reason: DRUGHIGH

## 2018-02-19 RX ORDER — LEVETIRACETAM 250 MG/1
250 TABLET ORAL DAILY
Qty: 30 TABLET | Refills: 11 | Status: SHIPPED | OUTPATIENT
Start: 2018-02-19

## 2018-02-19 ASSESSMENT — PAIN SCALES - GENERAL: PAINLEVEL: NO PAIN (0)

## 2018-02-19 NOTE — PROGRESS NOTES
[facility] Epilepsy Clinic:  Clinic: RETURN VISIT    HISTORY:  Mr. 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  He came alone to the visit today.      Following the most recent visit to this clinic, the patient elected to start lamotrigine.  He titrated up to 100 mg b.i.d., but he thought that this increased headaches and lightheadedness, and on his own he cut back to 50/75 mg daily.  He also decided to reduce levetiracetam from 250 mg b.i.d. down to 250 mg daily.  He thinks that he is having somewhat fewer headaches than he did when he was on the higher doses of both medications.      The patient has not had any reported seizures following the most recent visit to this clinic on 11/07/2017.  The most recent grand mal seizure occurred on 08/24/2017, and was unwitnessed but featured a postictal state identical to what he experienced following a prior witnessed grand mal seizure.      Ictal semiology-history:    The patient confirmed previously presented history in detail today. He again noted that prior to the first visit to this clinic, he had 1 witnessed generalized convulsion in his lifetime, with 2 possible unwitnessed convulsions arising in sleep, followed by apparent postictal states.  The first event was a witnessed convulsion on 04/09/2017.  He was riding in a car which was being driven by a friend.  He suddenly lost consciousness without any preceding aura or other particular symptoms.  He regained consciousness to find that he was confused and disoriented for many minutes.      The patient's friend reportedly witnessed a generalized convulsion during the time the patient was unconscious.  He was taken to the Emergency Department at [facility].  He was found to be on Wellbutrin and this was discontinued.  A head CT scan reportedly was normal.  He was not started on an anti-seizure  medication at that time.      Subsequently, the patient had 2 unwitnessed nocturnal events, both beginning when he was asleep.  The first occurred on 2017.  He had a sudden arousal from sleep in the early morning hours, and he noted that he had diffuse muscle soreness, mildly slowed thinking, evidence of hypersalivation on his face and pillow, and probable urinary incontinence (versus excessive perspiration only on his underwear).  He reported this to the staff at [facility], and was started on levetiracetam.  Later he requested a taper off levetiracetam and after the dose had been cut to 250 mg once per day, then on 2017 he had a similar episode.  Again, he was asleep and had a sudden arousal with drooling, diffuse muscle soreness, slowed and impaired thinking and on this occasion also with a headache.  He was instructed to increase levetiracetam again to 250 mg b.i.d.      The patient denied that he has ever had a paroxysmal episode of unresponsive staring without falling, non-convulsive falling with unconsciousness, repetitive involuntary movements or posturing, sudden brief confusion, or other paroxysmal phenomena.  He denied any history of sudden involuntary jerks while fully awake, but he has had hypnic jerks.      The patient does not recognize exacerbating or remitting factors with regard to seizure frequency.      Epilepsy-seizure predispositions:   The only family history of seizures or epilepsy occurred in a maternal great-great-uncle who had traumatic brain injury followed by grand mal seizures.      He has no history of gestational or  injury, febrile convulsions, developmental delay, stroke, meningitis, encephalitis, significant head injury, or other epileptic predispositions.  He denied a history of physical or sexual abuse by an adult during his childhood or adulthood.      Laboratory evaluations:   The patient reportedly had a normal routine electroencephalogram and a normal head CT  at [facility].    An outpatient 3-hour video EEG recording was normal in waking and drowsiness at [facility] on 10/23/2017.      Epilepsy therapeutics:   The patient was started on levetiracetam in 05/2017.  He noted irritability on 250 mg b.i.d. and levetiracetam was being tapered when a second nocturnal event occurred.  Subsequently, the dose was increased to 250 mg b.i.d., which continues to be associated with irritability, but no other apparent adverse effects.  He has continued to take levetiracetam as prescribed.  He has not been on other antiepileptic drugs.      Other history:   The patient has had a history of psychiatric dysfunction from the preteen years or possibly earlier.  Apparently he had inattentiveness in school from an early age, but ADD was not diagnosed and treated until his late teens.  He had 4 episodes of catatonia according to records on the [facility] chart, with 3 occurring between 11 and 12 years of age and the last at 16 years of age.  The patient himself has no memory of these events, except a memory of being told that they had occurred after the fact.  His mother observed them in detail.  On each occasion, he expressed considerable difficulty with handling stress for a number of days, and then went gradually into a state of markedly reduced responsiveness.  In general, he had eyes open and maintained posture, but he was unresponsive to commands and he did not speak or express himself in any way.  He could be led around by the hand without falling.  He was taken to the emergency room on at least 2 occasions, where no abnormalities of vital signs or general physical examination were found.  These were diagnosed as catatonic episodes without an overall diagnosis at that time.  Later he was diagnosed with recurrent moderate major depression, anxiety disorder, ADD and anorexia without bulimia.  One psychologist told him that he has obsessive and compulsive traits, but apparently OCD was not formally diagnosed.   He feels that he is doing better in depression and anxiety with the current regimen.  His mother denied that he has any delusions and there is no report of hallucinations.  He denied suicidal ideation today.     PAST MEDICAL-SURGICAL HISTORY:    1. 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   2. Recurrent moderate major depression, anxiety disorder, anorexia, ADD and recurrent catatonia, by history.     FAMILY HISTORY:  The only reported neurologic condition was posttraumatic epilepsy in a maternal great-great-uncle.      PERSONAL AND SOCIAL HISTORY:  The patient grew up in [place].  He graduated from high school in regular classes and attended approximately 2 years of college.  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  He lives with his parents.  He does not use tobacco, ethanol, or recreational drugs.      REVIEW OF SYSTEMS:  He denied history of attention and memory disturbance, difficulties with comprehension and expression, difficulty in solving problems, weakness, tremors or other abnormal involuntary movements, numbness or tingling, incoordination, falling or difficulty in walking, urinary or fecal incontinence, headache and other pain, except as described above.  He denied rashes and easy bruising.  The remainder of a 14-system symptom review was negative.    MEDICATIONS:  Lamotrigine 50/75 mg daily, levetiracetam 250 mg daily, methylphenidate, venlafaxine, mirtazapine, and other medications as per the electronic medical record.     PHYSICAL EXAMINATION:    The patient was an alert man in no acute distress, with continuous marked flattening of affect.  Vital signs were as per the electronic medical record.  Skull was normocephalic and atraumatic.  Neck was supple, without signs of meningeal irritation.       On neurological examination the patient appeared alert and was fully oriented to person, place, time, and reason for visit.  Speech showed  normal articulation, fluency, repetitions, naming, syntax and comprehension.  Cranial nerves III through XII were normal.  Muscle masses, tones and strengths were normal throughout.  There was no pronator drift.  Sequential fine finger movements were performed normally with each hand.  No spontaneous tremors, myoclonus, or other abnormal movements were observed.  Sensations of light touch, pin prick, vibration and proprioception were reportedly normal throughout.  The rapid alternating movements, and finger-nose-finger and heel-shin maneuvers were performed normally bilaterally.  Romberg maneuver was negative.  Regular, heel, toe, tandem and reverse tandem walking were normal.  Deep tendon reflexes were normal and symmetric throughout.  Toes were downgoing bilaterally.      IMPRESSION:    The patient likely has had 3 grand mal seizures due to idiopathic generalized epilepsy or perhaps to cryptogenic partial epilepsy.  He initially declined to have a brain MRI scan to evaluate potential causes of seizures, but he now has decided that he would like to proceed with this.  I will order a 3-Perla brain MRI.      The patient currently has no apparent adverse effects of lamotrigine and levetiracetam.  We will check levels of both medications today, and then make a recommendation on conversion to lamotrigine monotherapy, as was the original goal.      The patient clearly has a mixture of psychiatric symptoms.  He had multiple episodes of catatonia in the past.  He is progressing very poorly in early adult development, educationally and psychosocially, but I did not actually see evidence of a thought disorder in terms of disorganization, hallucinations or delusions.  He stated that he is following up with a psychiatrist at [facility] but he did not authorize the use of the Care Everywhere function or transfer of medical records.  He did specifically state that he had no thoughts of death or suicide.     The patient reported that  he stopped driving as was recommended at the time of the first seizure in 2017, and that he has not subsequently resumed driving.  He appeared to clearly understand that he is prohibited from operating a motor vehicle within 3 months following any seizure or other episode with sudden unconsciousness or inability to sit up, and that he is required to report any future such seizure to the DPS within 30 days after the event.  I also recommended that he and his family review all of his other activities, and avoid any activities that might lead to self-injury or injury of others, within 3 months following any seizure with impaired awareness or impaired motor control.  Such activities include but are not limited to holding babies or young children at heights from which they might be injured if dropped, bathing infants or young children in situations in which they might drown without continuous interactive care by an adult who is fully capable at all times during the bath, operating power cutting or other tools, handling firearms, exposure to heights from which he might fall, exposure to vessels with hot cooking oil or water, and tub-bathing or swimming alone.      PLAN:    1.  Brain 3 Perla MRI with seizure protocol.   2.  Check serum levetiracetam and lamotrigine levels today.   3.  I did not recommend medication changes, pending further planning based on blood levels.   4.  Return visit in approximately 4 months.   I spent 25 minutes in this patient care, more than 50% of which consisted of counseling and coordinating care.       Esau Rodríguez M.D.   Professor of Neurology        D: 2018   T: 2018   MT: PARISH      Name:     GRACE DONNELLY   MRN:      [id number]        Account:      [id number]   :      1995           Service Date: 2018      Document: [id number]

## 2018-02-19 NOTE — MR AVS SNAPSHOT
After Visit Summary   2/19/2018    Alex Peng    MRN: [id number]           Patient Information     Date Of Birth          1995        Visit Information        Provider Department      2/19/2018 1:30 PM Esau Rodríguez MD [facility] Neurology        Today's Diagnoses     Epilepsy, generalized, convulsive (H)    -  1    Generalized convulsive epilepsy (H)           Follow-ups after your visit        Follow-up notes from your care team     Return in about 4 months (around 6/19/2018).      Your next 10 appointments already scheduled     Feb 19, 2018  2:45 PM CST   LAB with  LAB   [facility] Lab ([facility])    [address] [place] [address] [id number] [phone]           Please do not eat 10-12 hours before your appointment if you are coming in fasting for labs on lipids, cholesterol, or glucose (sugar). This does not apply to pregnant women. Water, hot tea and black coffee (with nothing added) are okay. Do not drink other fluids, diet soda or chew gum.            Mar 04, 2018  9:15 AM CST   (Arrive by 9:00 AM)   MR BRAIN W/O & W CONTRAST with [id number] MRI ([facility])    [address] [place] [address] [id number] [phone]           Take your medicines as usual, unless your doctor tells you not to. Bring a list of your current medicines to your exam (including vitamins, minerals and over-the-counter drugs).  You may or may not receive intravenous (IV) contrast for this exam pending the discretion of the Radiologist.  You do not need to do anything special to prepare.  The MRI machine uses a strong magnet. Please wear clothes without metal (snaps, zippers). A sweatsuit works well, or we may give you a hospital gown.  Please remove any body piercings and hair extensions before you arrive. You will also remove watches, jewelry, hairpins, wallets, dentures, partial dental plates  and hearing aids. You may wear contact lenses, and you may be able to wear your rings. We have a safe place to keep your personal items, but it is safer to leave them at home.  **IMPORTANT** THE INSTRUCTIONS BELOW ARE ONLY FOR THOSE PATIENTS WHO HAVE BEEN PRESCRIBED SEDATION OR GENERAL ANESTHESIA DURING THEIR MRI PROCEDURE:  IF YOUR DOCTOR PRESCRIBED ORAL SEDATION (take medicine to help you relax during your exam):   You must get the medicine from your doctor (oral medication) before you arrive. Bring the medicine to the exam. Do not take it at home. You ll be told when to take it upon arriving for your exam.   Arrive one hour early. Bring someone who can take you home after the test. Your medicine will make you sleepy. After the exam, you may not drive, take a bus or take a taxi by yourself.  IF YOUR DOCTOR PRESCRIBED IV SEDATION:   Arrive one hour early. Bring someone who can take you home after the test. Your medicine will make you sleepy. After the exam, you may not drive, take a bus or take a taxi by yourself.   No eating 6 hours before your exam. You may have clear liquids up until 4 hours before your exam. (Clear liquids include water, clear tea, black coffee and fruit juice without pulp.)  IF YOUR DOCTOR PRESCRIBED ANESTHESIA (be asleep for your exam):   Arrive 1 1/2 hours early. Bring someone who can take you home after the test. You may not drive, take a bus or take a taxi by yourself.   No eating 8 hours before your exam. You may have clear liquids up until 4 hours before your exam. (Clear liquids include water, clear tea, black coffee and fruit juice without pulp.)   You will spend four to five hours in the recovery room.  Please call the Imaging Department at your exam site with any questions.            Jun 18, 2018  2:30 PM CDT   (Arrive by 2:15 PM)   Return Seizure with Esau Rodríguez MD   [facility] ([facility])    [address]  [id number] [phone]              Future tests that were ordered for you today     Open Future Orders        Priority Expected Expires Ordered    MRI Brain w & w/o contrast Routine  2/19/2019 2/19/2018    Keppra (Levetiracetam) Level Routine  2/20/2019 2/19/2018            Who to contact     Please call your clinic at [phone] to:    Ask questions about your health    Make or cancel appointments    Discuss your medicines    Learn about your test results    Speak to your doctor            Additional Information About Your Visit        [employer] Information     [employer] gives you secure access to your electronic health record. If you see a primary care provider, you can also send messages to your care team and make appointments. If you have questions, please call your primary care clinic.  If you do not have a primary care provider, please call [phone] and they will assist you.      [employer] is an electronic gateway that provides easy, online access to your medical records. With [employer], you can request a clinic appointment, read your test results, renew a prescription or communicate with your care team.     To access your existing account, please contact your [facility] or call [phone] for assistance.        Care EveryWhere ID     This is your Care EveryWhere ID. This could be used by other organizations to access your [facility] medical records  [id number]        Your Vitals Were     Pulse Height BMI (Body Mass Index)             94 1.727 m (5' 8\") 19.3 kg/m2          Blood Pressure from Last 3 Encounters:   02/19/18 118/70   11/07/17 114/77   12/30/15 118/80    Weight from Last 3 Encounters:   02/19/18 57.6 kg (126 lb 14.4 oz)   11/07/17 56.2 kg (123 lb 12.8 oz)   12/30/15 54.4 kg (120 lb)              We Performed the Following     Lamotrigine Level          Today's Medication Changes          These changes are accurate as of 2/19/18  2:37 PM.  If you have any " questions, ask your nurse or doctor.               These medicines have changed or have updated prescriptions.        Dose/Directions    CONCERTA 36 MG CR tablet   This may have changed:  Another medication with the same name was removed. Continue taking this medication, and follow the directions you see here.   Generic drug:  methylphenidate ER   Changed by:  Esau Rodríguez MD        Dose:  36 mg   Take 36 mg by mouth daily   Refills:  0       lamoTRIgine 25 MG tablet   Commonly known as:  LaMICtal   This may have changed:  additional instructions   Used for:  Generalized convulsive epilepsy (H)   Changed by:  Esau Rodríguez MD        Take 2 tablets (50 mg) in the AM and 3 tablets (75 mg) in the PM.   Quantity:  150 tablet   Refills:  11       levETIRAcetam 250 MG tablet   Commonly known as:  KEPPRA   This may have changed:  when to take this   Used for:  Epilepsy, generalized, convulsive (H)   Changed by:  Esau Rodríguez MD        Dose:  250 mg   Take 1 tablet (250 mg) by mouth daily   Quantity:  30 tablet   Refills:  11            Where to get your medicines      These medications were sent to [place] [address]     Phone:  [phone]     lamoTRIgine 25 MG tablet    levETIRAcetam 250 MG tablet                Primary Care Provider Office Phone # Fax #    Ap Jones [phone] [address] [address]        Equal Access to Services     PADMINI BEAN AH: Hadii gareth hoyos Sokarlene, waaxda luqadaha, qaybta kaalmada thierno, nilesh olivares. So [facility] [phone].    ATENCIÓN: Si habla español, tiene a bedolla disposición servicios gratuitos de asistencia lingüística. Llame al [phone].    We comply with applicable federal civil rights laws and [place] laws. We do not discriminate on the basis of race, color,  national origin, age, disability, sex, sexual orientation, or gender identity.            Thank you!     Thank you for choosing [facility]UR[facility]  for your care. Our goal is always to provide you with excellent care. Hearing back from our patients is one way we can continue to improve our services. Please take a few minutes to complete the written survey that you may receive in the mail after your visit with us. Thank you!             Your Updated Medication List - Protect others around you: Learn how to safely use, store and throw away your medicines at www.disposemymeds.org.          This list is accurate as of 2/19/18  2:37 PM.  Always use your most recent med list.                   Brand Name Dispense Instructions for use Diagnosis    CONCERTA 36 MG CR tablet   Generic drug:  methylphenidate ER      Take 36 mg by mouth daily        EFFEXOR XR PO      Take 225 mg by mouth daily        lamoTRIgine 25 MG tablet    LaMICtal    150 tablet    Take 2 tablets (50 mg) in the AM and 3 tablets (75 mg) in the PM.    Generalized convulsive epilepsy (H)       levETIRAcetam 250 MG tablet    KEPPRA    30 tablet    Take 1 tablet (250 mg) by mouth daily    Epilepsy, generalized, convulsive (H)       melatonin 3 MG tablet      Take 3 mg by mouth At Bedtime        REMERON 30 MG tablet   Generic drug:  mirtazapine      Take 30 mg by mouth daily        RESTORIL 30 MG capsule   Generic drug:  temazepam      Take 30 mg by mouth daily

## 2018-02-20 LAB
LAMOTRIGINE SERPL-MCNC: 2.6 UG/ML (ref 2.5–15)
LEVETIRACETAM SERPL-MCNC: <2 UG/ML (ref 12–46)

## 2018-02-22 NOTE — PROGRESS NOTES
[facility] Epilepsy Clinic: RETURN VISIT     I spent 25 minutes in this patient care, more than 50% of which consisted of counseling and coordinating care.   Esau Rodríguez M.D.

## 2018-02-27 ENCOUNTER — TELEPHONE (OUTPATIENT)
Dept: NEUROLOGY | Facility: CLINIC | Age: 23
End: 2018-02-27

## 2018-02-27 DIAGNOSIS — G40.309 GENERALIZED CONVULSIVE EPILEPSY (H): Primary | ICD-10-CM

## 2018-02-27 RX ORDER — LAMOTRIGINE 100 MG/1
TABLET, EXTENDED RELEASE ORAL
Qty: 60 TABLET | Refills: 1 | Status: SHIPPED | OUTPATIENT
Start: 2018-02-27 | End: 2018-05-08

## 2018-02-27 NOTE — TELEPHONE ENCOUNTER
Dr. Rodríguez asked this writer to call patient who had complaint of side effects with Lamotrigine and decreased his dose himself.  Dr. Rodríguez instructs to change Lamotrigine 100 mg tabs to xr formula and for patient to start  50 / 100 ( using 25 mg tabs and 100 mg XR tabs ), and then in 2 week change to 100 / 100 using XR formula tabs.  In additional 1-2 week labs are to be drawn.    Nurse made call to patient who is in agreement and Per patient request directions were also put in [employer] message to Patient.  Orders called to pharmacy and lab orders sent to [facility] per patient request.

## 2018-03-04 ENCOUNTER — RADIANT APPOINTMENT (OUTPATIENT)
Dept: MRI IMAGING | Facility: CLINIC | Age: 23
End: 2018-03-04
Attending: PSYCHIATRY & NEUROLOGY
Payer: COMMERCIAL

## 2018-03-04 DIAGNOSIS — G40.309 GENERALIZED CONVULSIVE EPILEPSY (H): ICD-10-CM

## 2018-03-04 RX ORDER — GADOBUTROL 604.72 MG/ML
7.5 INJECTION INTRAVENOUS ONCE
Status: COMPLETED | OUTPATIENT
Start: 2018-03-04 | End: 2018-03-04

## 2018-03-04 RX ADMIN — GADOBUTROL 5.5 ML: 604.72 INJECTION INTRAVENOUS at 09:21

## 2018-03-04 NOTE — DISCHARGE INSTRUCTIONS
MRI Contrast Discharge Instructions    The IV contrast you received today will pass out of your body in your  urine. This will happen in the next 24 hours. You will not feel this process.  Your urine will not change color.    Drink at least 4 extra glasses of water or juice today (unless your doctor  has restricted your fluids). This reduces the stress on your kidneys.  You may take your regular medicines.    If you are on dialysis: It is best to have dialysis today.    If you have a reaction: Most reactions happen right away. If you have  any new symptoms after leaving the hospital (such as hives or swelling),  call your hospital at the correct number below. Or call your family doctor.  If you have breathing distress or wheezing, call 911.    Special instructions: ***    I have read and understand the above information.    Signature:______________________________________ Date:___________    Staff:__________________________________________ Date:___________     Time:__________    [facility] Departments:    ___[place]: [phone]  ___[facility]: [phone]  ___[place]: [phone] ___[facility]: [phone]  ___[facility]: [phone]  ___[facility]: [phone]  ___[phone]  ___[facility]: [phone]  ___[phone]

## 2018-03-21 ENCOUNTER — TELEPHONE (OUTPATIENT)
Dept: NEUROLOGY | Facility: CLINIC | Age: 23
End: 2018-03-21

## 2018-03-21 NOTE — TELEPHONE ENCOUNTER
DMV Form received from patient via fax, DMV Form Completed and placed in MD folder for signature, DMV Form signed by MD, faxed to [place] Dept of Public Safety, Copy sent to be scanned into EHR, and copy sent to patient at address on file.  Phone call made to patient to inform them.

## 2018-05-03 DIAGNOSIS — G40.309 GENERALIZED CONVULSIVE EPILEPSY (H): ICD-10-CM

## 2018-05-08 DIAGNOSIS — G40.309 GENERALIZED CONVULSIVE EPILEPSY (H): ICD-10-CM

## 2018-05-08 NOTE — TELEPHONE ENCOUNTER
Health Call Center    Phone Message    May a detailed message be left on voicemail: yes    Reason for Call: Medication Refill Request    Has the patient contacted the pharmacy for the refill? Yes   Name of medication being requested: Pt needing refill of Rx. lamoTRIgine (LAMICTAL) 100 MG 24 hr tablet to the [facility] - [address].  Provider who prescribed the medication: Dr. Rodríguez  Pharmacy: [facility] - [address]  Date medication is needed: asap          Action Taken: Message routed to:  [facility]

## 2018-05-10 RX ORDER — LAMOTRIGINE 100 MG/1
TABLET, EXTENDED RELEASE ORAL
Qty: 60 TABLET | Refills: 1 | OUTPATIENT
Start: 2018-05-10

## 2018-05-10 RX ORDER — LAMOTRIGINE 100 MG/1
TABLET, EXTENDED RELEASE ORAL
Qty: 60 TABLET | Refills: 1 | Status: SHIPPED | OUTPATIENT
Start: 2018-05-10 | End: 2018-06-25

## 2018-05-10 NOTE — TELEPHONE ENCOUNTER
Health Call Center    Phone Message    May a detailed message be left on voicemail: no    Reason for Call: Medication Refill Request    Has the patient contacted the pharmacy for the refill? Yes   Name of medication being requested: lamoTRIgine (LAMICTAL) 100 MG 24 hr tablet  Provider who prescribed the medication: Dr. Rodríguez  Pharmacy: [facility]  Date medication is needed: TODAY, Pt is OUT, pharmacy wants a call back TODAY         Action Taken: Message routed to:  [facility]

## 2018-05-10 NOTE — TELEPHONE ENCOUNTER
[facility] Call Center    Phone Message    May a detailed message be left on voicemail: yes    Reason for Call: Other: Pt has been out of this medication for a few days now. He needs it as soon as possible, please give him a call back.    Action Taken: Message routed to:  [facility] Neurology

## 2018-06-25 DIAGNOSIS — G40.309 GENERALIZED CONVULSIVE EPILEPSY (H): ICD-10-CM

## 2018-06-25 RX ORDER — LAMOTRIGINE 100 MG/1
TABLET, EXTENDED RELEASE ORAL
Qty: 60 TABLET | Refills: 1 | Status: SHIPPED | OUTPATIENT
Start: 2018-06-25 | End: 2018-09-04

## 2018-09-04 DIAGNOSIS — G40.309 GENERALIZED CONVULSIVE EPILEPSY (H): ICD-10-CM

## 2018-09-05 RX ORDER — LAMOTRIGINE 100 MG/1
TABLET, EXTENDED RELEASE ORAL
Qty: 60 TABLET | Refills: 1 | Status: SHIPPED | OUTPATIENT
Start: 2018-09-05 | End: 2018-11-05

## 2018-10-05 ENCOUNTER — TELEPHONE (OUTPATIENT)
Dept: NEUROLOGY | Facility: CLINIC | Age: 23
End: 2018-10-05

## 2018-10-05 NOTE — TELEPHONE ENCOUNTER
[facility] Call Center    Phone Message    May a detailed message be left on voicemail: yes    Reason for Call: Form or Letter   Type or form/letter needing completion: FAX sent  Provider:Dr Rodríguez  Patient calling to confirm that fax arrived @ Neurology office just now- please let him know.         Action Taken: Message routed to:  [facility] Neurology

## 2018-10-08 ENCOUNTER — TELEPHONE (OUTPATIENT)
Dept: NEUROLOGY | Facility: CLINIC | Age: 23
End: 2018-10-08

## 2018-10-09 NOTE — TELEPHONE ENCOUNTER
DMV Form signed by MD, faxed to [place] [employer], Copy sent to be scanned into EHR, and copy sent to patient at address on file.  Phone call made to patient to inform them. Also faxed a copy to [phone] per patient request.

## 2018-11-02 DIAGNOSIS — G40.309 GENERALIZED CONVULSIVE EPILEPSY (H): ICD-10-CM

## 2018-11-02 RX ORDER — LAMOTRIGINE 100 MG/1
TABLET, EXTENDED RELEASE ORAL
Qty: 60 TABLET | Refills: 1 | Status: CANCELLED | OUTPATIENT
Start: 2018-11-02

## 2018-11-05 DIAGNOSIS — G40.309 GENERALIZED CONVULSIVE EPILEPSY (H): Primary | ICD-10-CM

## 2018-11-05 RX ORDER — LAMOTRIGINE 100 MG/1
TABLET, EXTENDED RELEASE ORAL
Qty: 60 TABLET | Refills: 1 | Status: SHIPPED | OUTPATIENT
Start: 2018-11-05 | End: 2019-01-20

## 2019-01-20 DIAGNOSIS — G40.309 GENERALIZED CONVULSIVE EPILEPSY (H): ICD-10-CM

## 2019-01-21 RX ORDER — LAMOTRIGINE 100 MG/1
TABLET, EXTENDED RELEASE ORAL
Qty: 60 TABLET | Refills: 0 | Status: SHIPPED | OUTPATIENT
Start: 2019-01-21 | End: 2019-02-26

## 2019-01-21 NOTE — TELEPHONE ENCOUNTER
Medication request meets [facility] Medication Refill Protocol - Seizure Medications (non-controlled) requirements.  [facility] message sent to patient notifying him to schedule a return appointment.

## 2019-02-25 DIAGNOSIS — G40.309 GENERALIZED CONVULSIVE EPILEPSY (H): ICD-10-CM

## 2019-02-26 RX ORDER — LAMOTRIGINE 100 MG/1
TABLET, EXTENDED RELEASE ORAL
Qty: 60 TABLET | Refills: 0 | Status: SHIPPED | OUTPATIENT
Start: 2019-02-26

## 2019-09-11 ENCOUNTER — TRANSFERRED RECORDS (OUTPATIENT)
Dept: HEALTH INFORMATION MANAGEMENT | Facility: CLINIC | Age: 24
End: 2019-09-11

## 2019-10-02 ENCOUNTER — HEALTH MAINTENANCE LETTER (OUTPATIENT)
Age: 24
End: 2019-10-02

## 2020-04-23 NOTE — PROGRESS NOTES
- Currently on Rocephin and Azithromycin  - Satting 90% on 2L of Oxygen  - Chest Xray reviewed by IM and Anesthesia  - Echo ordered  - WBC: 15  - Labs to be ordered this morning: CBC, CMP, PTT, PT, Fibrogen, D-Dimer, Ferritin, Lactic Acid    recs per medicine   Subjective:    Patient is a 22 year old male presenting with rehab left knee hpi.   Alex Peng is a 22 year old male with a bilateral knees condition.  Condition occurred with:  Insidious onset.  Condition occurred: for unknown reasons.  This is a recurrent condition  8/6/17.  Pt was working which required a lot of standing, thinks he had his knees hyper extended most of the day.  It was a lot more walking than usual, mostly on concrete floor.  Has been seen by his PCP and ortho.  No imaging done.  PCP provided knee braces which the pt has been wearing inconsistently and doesn't know their effect on his symptoms.  Pt reports having the same knee problems since 2007 from snowboarding injury.  Has off/on knee problems since then.  Current episode feels similar to previous episodes.  Pain has previously decreased by decreasing activity, has tried PT but can't recall its efficacy.  Pain increased yesterday - thinks it might be from a seated foot massage on a vibrating platform, a lot of walking, other activity the day before..    Patient reports pain:  Sub patellar.  Radiates to: lateral thigh.  Pain is described as burning and aching (occasionally sharp) and is intermittent and reported as 3/10.  Associated symptoms:  Other (cracking). Pain is the same all the time (with weight bearing).  Symptoms are exacerbated by weight bearing, kneeling, descending stairs and walking and relieved by rest.  Since onset symptoms are gradually improving (though increased yesterday).    Previous treatment includes physical therapy.  There was mild improvement following previous treatment.  General health as reported by patient is poor.  Pertinent medical history includes:  Seizures, depression and sleep disorder/apnea (seizures are controlled).  Medical allergies: no.  Other surgeries include:  None reported.  Current medications:  Anti-seizure medication, sleep medication, anti-depressants and other (stimulants).  Current  occupation is none.    Primary job tasks include:  Prolonged sitting, prolonged standing, lifting and other (pushing/pulling, computer work).    Barriers include:  None as reported by the patient.    Red flags:  None as reported by the patient.                        Objective:    Standing Alignment:        Lumbar:  Lordosis decr  Pelvic:  Normal  Hip deviations alignment: R>L increased femoral medial rotation.  Knee deviations alignment: pt reports he usually stands in B genu recurvatum, but he has been avoiding it lately due to pain.                Physical Exam     Functional Tests:  L single leg stance: Trendelenberg, reports instability  R single leg stance: femoral medial rotation, reports instability, slight increase in pain at patella  Partial squat: good femoral control, no change in sxs    Sensation:  Light touch intact and symmetrical B LEs    Strength:  Hip flexion: B 5/5 slight increase in pain B  Knee extension: B 5/5 pain free  Dorsiflexion: B 5/5 pain free  Glut med: B 4/5, pain R medial knee while resisting L  Glut max: B 4+/5, pain R in lumbar spine  Hamstrings: B 5/5, pain B anterior inferior knee    ROM:  Motion L R Comments   Active knee flexion 140 143 Pain free   Passive knee ext 5 deg hyper 8 deg hyper Pt states he can go further but it hurts, R>L pain     Palpation:   B tenderness along ITB and patellar tendon, non tender at joint lines and with patellar mobizations    Edema:  None noted    Gait:  R>L femoral medial rotation, excessive lumbopelvic rotation, lacks full knee ext B      General     ROS    Assessment/Plan:      Patient is a 22 year old male with both sides knee complaints.    Patient has the following significant findings with corresponding treatment plan.                Diagnosis 1:  B knee pain  Pain -  hot/cold therapy, manual therapy, education and home program  Decreased strength - therapeutic exercise, therapeutic activities and home program  Impaired gait - gait training  and home program  Impaired muscle performance - neuro re-education and home program  Decreased function - therapeutic activities and home program  Impaired posture - neuro re-education, therapeutic activities and home program    Therapy Evaluation Codes:   1) History comprised of:   Personal factors that impact the plan of care:      Past/current experiences and Time since onset of symptoms.    Comorbidity factors that impact the plan of care are:      Depression, Seizures and Sleep disorder/apnea.     Medications impacting care: Anti-depressant, Sleep and anti-seizure medication, stimulants.  2) Examination of Body Systems comprised of:   Body structures and functions that impact the plan of care:      Knee.   Activity limitations that impact the plan of care are:      Squatting/kneeling, Stairs and Walking.  3) Clinical presentation characteristics are:   Stable/Uncomplicated.  4) Decision-Making    Low complexity using standardized patient assessment instrument and/or measureable assessment of functional outcome.  Cumulative Therapy Evaluation is: Low complexity.    Previous and current functional limitations:  (See Goal Flow Sheet for this information)    Short term and Long term goals: (See Goal Flow Sheet for this information)     Communication ability:  Patient appears to be able to clearly communicate and understand verbal and written communication and follow directions correctly.  Treatment Explanation - The following has been discussed with the patient:   RX ordered/plan of care  Anticipated outcomes  Possible risks and side effects  This patient would benefit from PT intervention to resume normal activities.   Rehab potential is good.    Frequency:  1 X week, once daily  Duration:  for 6 weeks  Discharge Plan:  Achieve all LTG.  Independent in home treatment program.  Reach maximal therapeutic benefit.    Please refer to the daily flowsheet for treatment today, total treatment time and time spent  performing 1:1 timed codes.

## 2021-01-15 ENCOUNTER — HEALTH MAINTENANCE LETTER (OUTPATIENT)
Age: 26
End: 2021-01-15

## 2021-01-30 NOTE — LETTER
2/19/2018       RE: Alex Peng  [address]  [place] [id number]     Dear Colleague,    Thank you for referring your patient, Alex Peng, to the [facility] NEUROLOGY at [facility]. Please see a copy of my visit note below.        [facility]: RETURN VISIT     I spent 25 minutes in this patient care, more than 50% of which consisted of counseling and coordinating care.   Esau Rodríguez M.D.       Service Date: 02/19/2018      [facility]:  RETURN VISIT      HISTORY:  Mr. 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  He came alone to the visit today.      Following the most recent visit to this clinic, the patient elected to start lamotrigine.  He titrated up to 100 mg b.i.d., but he thought that this increased headaches and lightheadedness, and on his own he cut back to 50/75 mg daily.  He also decided to reduce levetiracetam from 250 mg b.i.d. down to 250 mg daily.  He thinks that he is having somewhat fewer headaches than he did when he was on the higher doses of both medications.      The patient has not had any reported seizures following the most recent visit to this clinic on 11/07/2017.  The most recent grand mal seizure occurred on 08/24/2017, and was unwitnessed but featured a postictal state identical to what he experienced following a prior witnessed grand mal seizure.      MEDICATIONS:   1.  Lamotrigine 50/75 mg daily.   2.  Levetiracetam 250 mg daily.   3.  Other medications as per the electronic medical record.      PHYSICAL EXAMINATION:  Forward and reverse tandem walking were normal.      IMPRESSION:  The patient likely has had 3 grand mal seizures due to idiopathic generalized epilepsy or perhaps to cryptogenic partial epilepsy.  He initially declined to have a brain  Addended by: JAMI FERNANDEZ on: 1/30/2021 10:11 AM     Modules accepted: Elizabeth, SmartSet     MRI scan to evaluate potential causes of seizures, but he now has decided that he would like to proceed with this.  I will order a 3-Perla brain MRI.      The patient currently has no apparent adverse effects of lamotrigine and levetiracetam.  We will check levels of both medications today, and then make a recommendation on conversion to lamotrigine monotherapy, as was the original goal.      PLAN:   1.  Brain 3 Perla MRI with seizure protocol.   2.  Check serum levetiracetam and lamotrigine levels today.   3.  I did not recommend medication changes, pending further planning based on blood levels.   4.  Return visit in approximately 4 months.      I spent 25 minutes on this clinic visit.      MD ESAU Dominique MD             D: 2018   T: 2018   MT: PARISH      Name:     GRACE DONNELLY   MRN:      [id number]        Account:      [id number]   :      1995           Service Date: 2018      Document: [id number]       Again, thank you for allowing me to participate in the care of your patient.      Sincerely,    Esau Rodríguez MD

## 2021-09-04 ENCOUNTER — HEALTH MAINTENANCE LETTER (OUTPATIENT)
Age: 26
End: 2021-09-04

## 2022-02-19 ENCOUNTER — HEALTH MAINTENANCE LETTER (OUTPATIENT)
Age: 27
End: 2022-02-19

## 2022-10-22 ENCOUNTER — HEALTH MAINTENANCE LETTER (OUTPATIENT)
Age: 27
End: 2022-10-22

## 2023-04-01 ENCOUNTER — HEALTH MAINTENANCE LETTER (OUTPATIENT)
Age: 28
End: 2023-04-01

## 2024-09-30 ENCOUNTER — OFFICE VISIT (OUTPATIENT)
Dept: FAMILY MEDICINE | Facility: CLINIC | Age: 29
End: 2024-09-30
Payer: COMMERCIAL

## 2024-09-30 VITALS
RESPIRATION RATE: 16 BRPM | TEMPERATURE: 99.1 F | DIASTOLIC BLOOD PRESSURE: 52 MMHG | SYSTOLIC BLOOD PRESSURE: 104 MMHG | HEART RATE: 76 BPM | OXYGEN SATURATION: 98 % | WEIGHT: 125 LBS | HEIGHT: 68 IN | BODY MASS INDEX: 18.94 KG/M2

## 2024-09-30 DIAGNOSIS — G40.909 SEIZURE DISORDER (H): Primary | ICD-10-CM

## 2024-09-30 PROCEDURE — 99213 OFFICE O/P EST LOW 20 MIN: CPT

## 2024-09-30 SDOH — HEALTH STABILITY: PHYSICAL HEALTH: ON AVERAGE, HOW MANY MINUTES DO YOU ENGAGE IN EXERCISE AT THIS LEVEL?: 60 MIN

## 2024-09-30 SDOH — HEALTH STABILITY: PHYSICAL HEALTH: ON AVERAGE, HOW MANY DAYS PER WEEK DO YOU ENGAGE IN MODERATE TO STRENUOUS EXERCISE (LIKE A BRISK WALK)?: 5 DAYS

## 2024-09-30 ASSESSMENT — SOCIAL DETERMINANTS OF HEALTH (SDOH): HOW OFTEN DO YOU GET TOGETHER WITH FRIENDS OR RELATIVES?: TWICE A WEEK

## 2024-09-30 NOTE — PROGRESS NOTES
Office Visit  [facility]  Figueroa Cruz NP, Nurse Practitioner Primary Care  Sep 30, 2024      Assessment & Plan     Seizure disorder (H)  - Adult Neurology  Referral; Future  Patient not currently on seizure medication. Notes his last seizure to be in 2017 with previous negative EEG (Oct 2017) and MRI (March 2018).  Patient notes he has been seen more recently by [facility], but we do not have the records of this.  Despite the fact that it has been 7 years since his past seizure, it is unclear to me if patient should be cleared completely for driving without seizure medication, or if neurology should (or has already signed off) on the plan. Patient did express some understandable frustration about needing to be cleared, and he reports he could lose his license if not cleared within a week (and has had multiple negative testing done). I also want to be safe as well with clearance, as the consequences of a seizure while driving could be significant.  I did page the on-call epilepsy neurologist, Dr. Rodríguez, who had also previously evaluated this patient. He did advise that the most recent neurology group who has seen him ([facility]) should be making the determination, or if they did not clear him from needing seizure medication, he advised a repeat neurology evaluation with [facility] or another neurology group would be necessary.        Two spells total noted in chart in 2017. Maybe medication related? Bupropion/Concerta  Waqar Low is a 29 year old, presenting for the following:    Physical and Establish Care    Was on seizure medication, and it no longer on seizure medication. He reports that patient didn't think he needed the medication and that [facility] had worked with him to taper off the medication.  2017- had an episode of whole body jerking, and unclear how many minutes before waking up. After waking up of event, felt  nausea.  Last time he was on medication was 2019, and has not had a seizure.  Reports he has had multiple MRIs in the past, EEG and sleep deprived.  Had a brain MRI for screening for lesions that was negative in March. Had normal EEG in 2017.      Physical Exam  GENERAL: alert and no distress  PSYCH: mentation appears normal, affect normal/bright        Signed Electronically by: Figueroa Cruz NP

## 2024-10-20 ENCOUNTER — HEALTH MAINTENANCE LETTER (OUTPATIENT)
Age: 29
End: 2024-10-20